# Patient Record
Sex: FEMALE | Race: WHITE | NOT HISPANIC OR LATINO | Employment: UNEMPLOYED | URBAN - METROPOLITAN AREA
[De-identification: names, ages, dates, MRNs, and addresses within clinical notes are randomized per-mention and may not be internally consistent; named-entity substitution may affect disease eponyms.]

---
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
## 2018-01-10 NOTE — RESULT NOTES
Message  No action needed  Verified Results  US ABDOMEN LIMITED [id number] 02:11PM Madi Garcia Order Number: [id number]   Performing Comments: Patient has soft tissue masses that requires evaluation  Bilateral soft tissue masses on back at approximately L12, mid-scapular    - Patient Instructions: To schedule this appointment, please contact Central Scheduling at [phone]  Test Name Result Flag Reference   US ABDOMEN LIMITED (Report)     Ultrasound lower back     History: Lumps on back  Comparison: None     Comments: Transcutaneous ultrasonographic images were obtained with attention to the soft tissues of the lower back bilaterally  Doppler imaging was not utilized  Normal subcutaneous soft tissues noted at the lower back soft tissues  Normal underlying musculature and osseous structures  No soft tissue mass identified  IMPRESSION:   Impression: No ultrasound abnormality identified  If clinical concern persists, consider contrast-enhanced MRI of the lumbar spine         Workstation performed: [id number]     Signed by:   Mere Carter MD   2/13/17       Signatures   Electronically signed by : CINDY Gonzalez ; Feb 16 2017  7:30PM EST                       (Author)

## 2018-01-10 NOTE — PSYCH
Behavioral Health Outpatient Intake    Referred By: mom IS EMPLOYEE  Intake Questions: there are no developmental disabilities  the patient does not have a hearing impairment  the patient does not have an ICM or CTT  patient is not taking injectable psychiatric medications  Employment: The patient is not employed  Emergency Contact Information:   Emergency Contact: VERONICA DILLARD   Relationship to Patient: MOTHER   Phone Number: [phone]   Previous Psychiatric Treatment: She has not been previously seen by a psychiatrist     She has previously been seen by a therapist  [address]   History: no  service  She has not had combat service  [facility]   : [phone]   Address: [address]   Employer: [place] [address]   Primary Insurance: [facility]   ID number: [id number]   Group number: [id number]         Presenting Problem (in patient's words): DEPRESSION  Substance Abuse: NONE  Previous Treatment: The patient has not been seen here in the past    A member of this patient's family has previously seen Wanda Theodore DR [facility] for therapy  Accepted as Patient   DR Scarlet Vila 16 @ 11:30AM     Primary Care Physician: DR Nanci Dyer       Signatures   Electronically signed by : Alexus England, ; Oct 19 2016  4:21PM EST                       (Author)

## 2018-01-10 NOTE — MISCELLANEOUS
Provider Comments  Provider Comments:   PT WAS A NO SHOW TODAY      Signatures   Electronically signed by : Angelia Jeans, ; Jan 4 2017  4:18PM EST                       (Author)

## 2018-01-11 NOTE — RESULT NOTES
Message  Left a voicemail for the patient to return my call to discuss test results  She has anemia  She is to take iron supplementation (2 pills every AM 1 pill every PM)  I also prescribed her Colace to help with her bowel movements as iron can be constipating  Her mono was negative and TSH was normal  If she has further questions please let me know and I will call her back  She should return for follow up in 3 months, have new labs drawn  I will order this now, she can stop by the clinic and  her lab slip at her leisure  Verified Results  (1) TSH [id number] 02:48PM Hilary Apodaca     Test Name Result Flag Reference   TSH 1 457 uIU/mL  0 463-3 980   Patients undergoing fluorescein dye angiography may retain small amounts of fluorescein in the body for 48-72 hours post procedure  Samples containing fluorescein can produce falsely depressed TSH values  If the patient had this procedure,a specimen should be resubmitted post fluorescein clearance  The recommended reference ranges for TSH during pregnancy are as follows:  First trimester 0 1 to 2 5 uIU/mL  Second trimester  0 2 to 3 0 uIU/mL  Third trimester 0 3 to 3 0 uIU/m       Plan  Anemia    · Docusate Sodium 100 MG Oral Capsule; take 1 capsule daily   · Ferrous Sulfate 324 (65 Fe) MG Oral Tablet Delayed Release; Take 2 tablets every  morning with food   Take 1 tablet every evening with dinner   · Follow-up visit in 3 months Evaluation and Treatment  Follow-up  Status: Hold For -  Scheduling  Requested for: 89Zre8062   · Do not take iron supplements with milk, antacids, coffee or tea ; Status:Complete;   Done:  72SJH1710   · Drink plenty of fluids ; Status:Complete;   Done: 44ZAL1391   · Eat a diet high in iron ; Status:Complete;   Done: 47PSM5095   · Call [phone] if: Your bowel movements are hard, difficult to push out, or if several  days have gone by without a bowel movement ; Status:Complete;   Done: 46YRP0800   · Seek Immediate Medical Attention if: You see any blood in the stool ; Status:Complete;    Done: 60CJN2070   · (1) CBC/ PLT (NO DIFF); Status:Active; Requested for:40Dyy1957;    · (1) CBC/ PLT (NO DIFF) ; every 3 months;  Next 16HLS5408; Status:Active    Signatures   Electronically signed by : CINDY Grant ; Dec 30 2016  3:38PM EST                       (Author)

## 2018-01-12 NOTE — MISCELLANEOUS
Message  Message Free Text Note Form: Left a voicemail for the patient to return my call to discuss test results  She has anemia  She is to take iron supplementation (2 pills every AM 1 pill every PM)  I also prescribed her Colace to help with her bowel movements as iron can be constipating  Her mono was negative and TSH was normal  If she has further questions please let me know and I will call her back  She should return for follow up in 3 months, have new labs drawn  I will order this now, she can stop by the clinic and  her lab slip at her leisure  Plan    1  Docusate Sodium 100 MG Oral Capsule; take 1 capsule daily   2  Ferrous Sulfate 324 (65 Fe) MG Oral Tablet Delayed Release; Take 2 tablets every   morning with food  Take 1 tablet every evening with dinner   3  Follow-up visit in 3 months Evaluation and Treatment  Follow-up  Status: Hold For -   Scheduling  Requested for: 43OEW6221   4  Do not take iron supplements with milk, antacids, coffee or tea ; Status:Complete;     Done: 00EEH8905   5  Drink plenty of fluids ; Status:Complete;   Done: 47AIN4499   6  Eat a diet high in iron ; Status:Complete;   Done: 29UUN7225   7  Call [phone] if: Your bowel movements are hard, difficult to push out, or if several   days have gone by without a bowel movement ; Status:Complete;   Done: 75XBZ0904   8  Seek Immediate Medical Attention if: You see any blood in the stool ; Status:Complete;     Done: 16CET3555   9  (1) CBC/ PLT (NO DIFF); Status:Active; Requested for:48Apt3884;    10  (1) CBC/ PLT (NO DIFF) ; every 3 months;  Next 45FVG2468; Status:Active    Signatures   Electronically signed by : CINDY Parsons ; Dec 30 2016  3:35PM EST                       (Author)    Electronically signed by : Jayro Norwood DO; [place]  3 2017 10:41AM EST                       (Author)
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
## 2018-10-09 ENCOUNTER — TELEPHONE (OUTPATIENT)
Dept: FAMILY MEDICINE CLINIC | Facility: CLINIC | Age: 18
End: 2018-10-09